# Patient Record
Sex: MALE | URBAN - NONMETROPOLITAN AREA
[De-identification: names, ages, dates, MRNs, and addresses within clinical notes are randomized per-mention and may not be internally consistent; named-entity substitution may affect disease eponyms.]

---

## 2022-02-10 ENCOUNTER — APPOINTMENT (RX ONLY)
Dept: URBAN - NONMETROPOLITAN AREA CLINIC 16 | Facility: CLINIC | Age: 32
Setting detail: DERMATOLOGY
End: 2022-02-10

## 2022-02-10 DIAGNOSIS — L30.9 DERMATITIS, UNSPECIFIED: ICD-10-CM | Status: INADEQUATELY CONTROLLED

## 2022-02-10 PROCEDURE — 99203 OFFICE O/P NEW LOW 30 MIN: CPT

## 2022-02-10 PROCEDURE — ? PRESCRIPTION

## 2022-02-10 PROCEDURE — ? FULL BODY SKIN EXAM - DECLINED

## 2022-02-10 PROCEDURE — ? TREATMENT REGIMEN

## 2022-02-10 PROCEDURE — ? COUNSELING

## 2022-02-10 RX ORDER — TRIAMCINOLONE ACETONIDE 1 MG/G
CREAM TOPICAL
Qty: 454 | Refills: 3 | Status: ERX | COMMUNITY
Start: 2022-02-10

## 2022-02-10 RX ADMIN — TRIAMCINOLONE ACETONIDE: 1 CREAM TOPICAL at 00:00

## 2022-02-10 ASSESSMENT — LOCATION DETAILED DESCRIPTION DERM
LOCATION DETAILED: LEFT PROXIMAL DORSAL FOREARM
LOCATION DETAILED: LEFT PROXIMAL PRETIBIAL REGION

## 2022-02-10 ASSESSMENT — LOCATION SIMPLE DESCRIPTION DERM
LOCATION SIMPLE: LEFT FOREARM
LOCATION SIMPLE: LEFT PRETIBIAL REGION

## 2022-02-10 ASSESSMENT — SEVERITY ASSESSMENT: SEVERITY: MODERATE TO SEVERE

## 2022-02-10 ASSESSMENT — LOCATION ZONE DERM
LOCATION ZONE: ARM
LOCATION ZONE: LEG

## 2022-02-10 ASSESSMENT — BSA RASH: BSA RASH: 70

## 2022-02-10 NOTE — PROCEDURE: TREATMENT REGIMEN
Plan: Pt states that he has had a similar dermatitis in the past that spontaneously resolved. \\n\\nCurrent flare has been present for less than one year but more than months. He was on prednisone for the whole month of December with significant improvement; however he was also on an abx for suspected cellulitis and they believe he had an allergic reaction. \\n\\nHe has been using halobetasol with significant improvement; however it was very expensive and the pt currently has no insurance. \\n\\nPt states that his wife is starting a position with the [employer] in 2 weeks and they will have insurance again. Will call when he has insurance Plan: Pt states that he has had a similar dermatitis in the past that spontaneously resolved. \\n\\nCurrent flare has been present for less than one year but more than months. He was on prednisone for the whole month of December with significant improvement; however he was also on an abx for suspected cellulitis and they believe he had an allergic reaction. \\n\\nHe has been using halobetasol with significant improvement; however it was very expensive and the pt currently has no insurance. \\n\\nPt states that his wife is starting a position with the [employer] in 2 weeks and they will have insurance again. Will call when he has insurance